# Patient Record
Sex: FEMALE | Race: WHITE | NOT HISPANIC OR LATINO | Employment: UNEMPLOYED | URBAN - METROPOLITAN AREA
[De-identification: names, ages, dates, MRNs, and addresses within clinical notes are randomized per-mention and may not be internally consistent; named-entity substitution may affect disease eponyms.]

---

## 2023-11-14 ENCOUNTER — OFFICE VISIT (OUTPATIENT)
Dept: OBSTETRICS AND GYNECOLOGY | Facility: CLINIC | Age: 33
End: 2023-11-14
Payer: COMMERCIAL

## 2023-11-14 VITALS
WEIGHT: 150 LBS | HEIGHT: 60 IN | DIASTOLIC BLOOD PRESSURE: 81 MMHG | SYSTOLIC BLOOD PRESSURE: 141 MMHG | BODY MASS INDEX: 29.45 KG/M2

## 2023-11-14 DIAGNOSIS — R10.2 PELVIC PAIN: ICD-10-CM

## 2023-11-14 DIAGNOSIS — Z01.419 VISIT FOR GYNECOLOGIC EXAMINATION: Primary | ICD-10-CM

## 2023-11-14 RX ORDER — LEVONORGESTREL / ETHINYL ESTRADIOL AND ETHINYL ESTRADIOL 150-30(84)
1 KIT ORAL DAILY
Qty: 91 TABLET | Refills: 3 | Status: SHIPPED | OUTPATIENT
Start: 2023-11-14 | End: 2024-11-13

## 2023-11-14 NOTE — PROGRESS NOTES
[facility]  [address] [facility], [address]  [place], [place] [address]  Phone: [phone] / Fax:  [phone]      11/14/2023    [address] [place] [address]    Provider, No Known    Chief Complaint   Patient presents with    Gynecologic Exam     NP Annual Exam, last pap 1 year ago-NL per patient, she does have a history of abnormal's. Patient has moved from [place] due to her husbands job. While in [place] she was diagnosed with Adenomyosis, began taking birth control to help alleviate her discomfort and was planning to schedule a hysterectomy.       Desire Begum is here for annual gynecologic exam.  HPI - Patient with previous history of abnormal pap but recent normal testing; she reports normal pap one year ago in [place].  She has ongoing pelvic pain with nearly daily cramping and moderate to severe menses; she had work up with previous gynecologist with plan to proceed with hysterectomy if OCP failed.  She has noticed only a modest improvement in symptoms.  She has completed childbearing.    Past Medical History:   Diagnosis Date    Abnormal Pap smear of cervix        Past Surgical History:   Procedure Laterality Date    COLPOSCOPY      SHOULDER SURGERY Right     x2       No Known Allergies    Social History     Socioeconomic History    Marital status:    Tobacco Use    Smoking status: Never   Vaping Use    Vaping Use: Never used   Substance and Sexual Activity    Alcohol use: Yes    Drug use: Never    Sexual activity: Yes     Partners: Male     Birth control/protection: Birth control pill, Vasectomy       Family History   Problem Relation Age of Onset    Hypertension Father     Diabetes Father     Hyperlipidemia Father     No Known Problems Mother     No Known Problems Brother     No Known Problems Sister     Colon cancer Maternal Grandmother     Hyperlipidemia Maternal Grandmother     Diabetes Maternal Grandmother     Hypertension Maternal Grandmother     Cancer Maternal Grandmother         " bladder    No Known Problems Maternal Grandfather     Breast cancer Maternal Great-Grandmother        Patient's last menstrual period was 10/19/2023 (exact date).    OB History          3    Para        Term                AB        Living             SAB        IAB        Ectopic        Molar        Multiple        Live Births   3                Vitals:    23 1102   BP: 141/81   Weight: 68 kg (150 lb)   Height: 152.4 cm (60\")       Physical Exam  Constitutional:       Appearance: Normal appearance. She is well-developed.   Genitourinary:      Bladder and urethral meatus normal.      Right Labia: No tenderness or lesions.     Left Labia: No tenderness or lesions.     No vaginal discharge or tenderness.        Right Adnexa: not tender and not full.     Left Adnexa: not tender and not full.     No cervical motion tenderness or lesion.      Uterus is not enlarged or tender.      No urethral tenderness present.   Breasts:     Right: No mass or nipple discharge.      Left: No mass or nipple discharge.   HENT:      Right Ear: External ear normal.      Left Ear: External ear normal.      Nose: Nose normal.   Eyes:      Conjunctiva/sclera: Conjunctivae normal.   Neck:      Thyroid: No thyromegaly.   Cardiovascular:      Rate and Rhythm: Normal rate and regular rhythm.      Heart sounds: Normal heart sounds.   Pulmonary:      Effort: Pulmonary effort is normal.      Breath sounds: No stridor. No wheezing.   Abdominal:      Palpations: Abdomen is soft.      Tenderness: There is no abdominal tenderness. There is no guarding or rebound.   Musculoskeletal:         General: Normal range of motion.      Cervical back: Normal range of motion and neck supple.   Neurological:      Mental Status: She is alert.      Coordination: Coordination normal.   Skin:     General: Skin is warm and dry.   Psychiatric:         Mood and Affect: Mood normal.         Behavior: Behavior normal.         Thought Content: Thought " content normal.         Judgment: Judgment normal.   Vitals reviewed. Exam conducted with a chaperone present.         Diagnoses and all orders for this visit:    1. Visit for gynecologic examination (Primary)        -     Discussed importance of regular screening and breast awareness.  2. Pelvic pain  -     Levonorgest-Eth Estrad 91-Day (Seasonique) 0.15-0.03 &0.01 MG; Take 1 tablet by mouth Daily.  Dispense: 91 tablet; Refill: 3  -     Obtain records from gynecologist in [place].  Discussed trial of different medical therapy versus proceeding with hysterectomy.  Patient is candidate for TLH with ovarian preservation.  If no improvement on extended OCP, patient to return for pre-operative visit.        Noah Ellison MD

## 2023-11-16 ENCOUNTER — PATIENT ROUNDING (BHMG ONLY) (OUTPATIENT)
Dept: OBSTETRICS AND GYNECOLOGY | Facility: CLINIC | Age: 33
End: 2023-11-16
Payer: COMMERCIAL

## 2023-11-16 ENCOUNTER — PATIENT MESSAGE (OUTPATIENT)
Dept: OBSTETRICS AND GYNECOLOGY | Facility: CLINIC | Age: 33
End: 2023-11-16
Payer: COMMERCIAL

## 2023-11-16 NOTE — PROGRESS NOTES
My chart message has been sent to the patient for PATIENT ROUNDING with [facility] [place].

## 2024-02-05 DIAGNOSIS — R10.2 PELVIC PAIN: ICD-10-CM

## 2024-02-06 RX ORDER — LEVONORGESTREL / ETHINYL ESTRADIOL AND ETHINYL ESTRADIOL 150-30(84)
1 KIT ORAL DAILY
Qty: 91 TABLET | Refills: 3 | Status: SHIPPED | OUTPATIENT
Start: 2024-02-06 | End: 2025-02-05

## 2024-11-21 ENCOUNTER — OFFICE VISIT (OUTPATIENT)
Dept: OBSTETRICS AND GYNECOLOGY | Facility: CLINIC | Age: 34
End: 2024-11-21
Payer: COMMERCIAL

## 2024-11-21 VITALS
HEIGHT: 60 IN | WEIGHT: 150 LBS | SYSTOLIC BLOOD PRESSURE: 138 MMHG | DIASTOLIC BLOOD PRESSURE: 88 MMHG | BODY MASS INDEX: 29.45 KG/M2

## 2024-11-21 DIAGNOSIS — Z01.419 ENCOUNTER FOR GYNECOLOGICAL EXAMINATION WITHOUT ABNORMAL FINDING: Primary | ICD-10-CM

## 2024-11-21 DIAGNOSIS — Z30.41 ENCOUNTER FOR SURVEILLANCE OF CONTRACEPTIVE PILLS: ICD-10-CM

## 2024-11-21 RX ORDER — LEVONORGESTREL / ETHINYL ESTRADIOL AND ETHINYL ESTRADIOL 150-30(84)
1 KIT ORAL DAILY
Qty: 30 TABLET | Refills: 11 | Status: SHIPPED | OUTPATIENT
Start: 2024-11-21 | End: 2025-11-21

## 2024-11-21 NOTE — PROGRESS NOTES
GYN ANNUAL EXAM     Chief Complaint   Patient presents with    Annual Exam     AE and pap        HPI    Desire is a 34 y.o. female who presents for annual well woman exam. She is a patient of Dr. Ellison. She had an ultrasound performed at her previous OBGYN office that suggested adenomyosis.     OB History          3    Para        Term                AB        Living             SAB        IAB        Ectopic        Molar        Multiple        Live Births   3                SUBJECTIVE    MENSTRUAL & SEXUAL HEALTH  LMP: No LMP recorded (lmp unknown).  Menses regularity: rare with Seasonique  Dysmenorrhea: none  Cyclic symptoms: none  Current contraception: OCP (estrogen/progesterone) - Seasonique  Last pap: , Normal PAP  History of abnormal pap:  yes  History of STD: No  Family history of cancer: denies       Family history of breast cancer: no  Performs SBE: performs monthly.  Incontinence: Patient reports that she is not currently experiencing any symptoms of urinary incontinence.   Dyspareunia: no    Past Medical History:   Diagnosis Date    Abnormal Pap smear of cervix        Past Surgical History:   Procedure Laterality Date    COLPOSCOPY      SHOULDER SURGERY Right     x2         Current Outpatient Medications:     Levonorgest-Eth Estrad 91-Day (Seasonique) 0.15-0.03 &0.01 MG, Take 1 tablet by mouth Daily., Disp: 91 tablet, Rfl: 3    No Known Allergies    Social History     Tobacco Use    Smoking status: Never   Vaping Use    Vaping status: Never Used   Substance Use Topics    Alcohol use: Yes    Drug use: Never       Family History   Problem Relation Age of Onset    Hypertension Father     Diabetes Father     Hyperlipidemia Father     No Known Problems Mother     No Known Problems Brother     No Known Problems Sister     Colon cancer Maternal Grandmother     Hyperlipidemia Maternal Grandmother     Diabetes Maternal Grandmother     Hypertension Maternal Grandmother     Cancer Maternal  "Grandmother         bladder    No Known Problems Maternal Grandfather     Breast cancer Maternal Great-Grandmother        Review of Systems   Constitutional:  Negative for fatigue, unexpected weight gain and unexpected weight loss.   Gastrointestinal:  Negative for abdominal pain.   Genitourinary:  Negative for decreased libido, difficulty urinating, dyspareunia, dysuria, pelvic pain, pelvic pressure, urgency, urinary incontinence and vaginal discharge.   All other systems reviewed and are negative.      OBJECTIVE    /88   Ht 152.4 cm (60\")   Wt 68 kg (150 lb)   LMP  (LMP Unknown)   BMI 29.29 kg/m²     Physical Exam  Constitutional:       General: She is awake. She is not in acute distress.     Appearance: Normal appearance. She is well-developed and well-groomed. She is not ill-appearing.   Genitourinary:      Vulva, bladder and urethral meatus normal.      Right Labia: No rash, tenderness, lesions or skin changes.     Left Labia: No tenderness, lesions, skin changes or rash.     No labial fusion noted.      No inguinal adenopathy present in the right or left side.     No vaginal discharge, erythema, tenderness, bleeding, ulceration or granulation tissue.      No vaginal prolapse present.     No vaginal atrophy present.     No cervical discharge, friability, lesion, polyp, eversion or elongation.      Uterus is not enlarged, tender or prolapsed.      Pelvic exam was performed with patient in the lithotomy position.   Breasts:     Breasts are symmetrical.      Breasts are soft.     Right: Normal.      Left: Normal.   HENT:      Head: Normocephalic and atraumatic.   Eyes:      General: No scleral icterus.     Conjunctiva/sclera: Conjunctivae normal.   Cardiovascular:      Rate and Rhythm: Normal rate and regular rhythm.      Heart sounds: Normal heart sounds.   Pulmonary:      Effort: Pulmonary effort is normal.      Breath sounds: Normal breath sounds.   Abdominal:      Palpations: Abdomen is soft.      " Hernia: There is no hernia in the left inguinal area or right inguinal area.   Musculoskeletal:         General: Normal range of motion.      Cervical back: Normal range of motion.   Lymphadenopathy:      Lower Body: No right inguinal adenopathy. No left inguinal adenopathy.   Neurological:      Mental Status: She is alert.   Skin:     General: Skin is warm and dry.      Coloration: Skin is not jaundiced or pale.   Psychiatric:         Behavior: Behavior normal. Behavior is cooperative.   Vitals reviewed.         ASSESSMENT     Diagnoses and all orders for this visit:    1. Encounter for gynecological examination without abnormal finding (Primary)         PLAN   WELL WOMAN EXAM: Pap smear collected today. Recommend MVI daily.    CONTRACEPTION: OCP (estrogen/progesterone) refilled x 1 year - ACHES discussed. .   SMOKING STATUS: non smoker.  BREAST HEALTH: Encouraged annual mammogram screening starting at age 40. Instructed on how to perform SBE. Encouraged breast health self awareness.  EXERCISE: Encouraged 150 minutes of exercise per week if not medially contraindicated.   BMI: Body mass index is 29.29 kg/m².     No follow-ups on file.    I spent 30 minutes caring for Desire on this date of service. This time includes time spent by me in the following activities: preparing for the visit, reviewing tests, performing a medically appropriate examination and/or evaluation, counseling and educating the patient/family/caregiver, referring and communicating with other health care professionals, documenting information in the medical record, independently interpreting results and communicating that information with the patient/family/caregiver, care coordination, ordering medications, ordering test(s), ordering procedure(s), obtaining a separately obtained history, and reviewing a separately obtained history.    Joanne Cisneros CNM  11/21/2024  11:54 EST

## 2024-11-29 LAB
CYTOLOGIST CVX/VAG CYTO: NORMAL
CYTOLOGY CVX/VAG DOC CYTO: NORMAL
CYTOLOGY CVX/VAG DOC THIN PREP: NORMAL
DX ICD CODE: NORMAL
HPV I/H RISK 4 DNA CVX QL PROBE+SIG AMP: NEGATIVE
Lab: NORMAL
OTHER STN SPEC: NORMAL
STAT OF ADQ CVX/VAG CYTO-IMP: NORMAL